# Patient Record
Sex: MALE
[De-identification: names, ages, dates, MRNs, and addresses within clinical notes are randomized per-mention and may not be internally consistent; named-entity substitution may affect disease eponyms.]

---

## 2018-04-27 ENCOUNTER — HOSPITAL ENCOUNTER (OUTPATIENT)
Dept: HOSPITAL 5 - CATHLABREC | Age: 23
Discharge: HOME | End: 2018-04-27
Attending: INTERNAL MEDICINE
Payer: COMMERCIAL

## 2018-04-27 VITALS — SYSTOLIC BLOOD PRESSURE: 102 MMHG | DIASTOLIC BLOOD PRESSURE: 47 MMHG

## 2018-04-27 DIAGNOSIS — R55: Primary | ICD-10-CM

## 2018-04-27 DIAGNOSIS — R94.39: ICD-10-CM

## 2018-04-27 DIAGNOSIS — Z88.1: ICD-10-CM

## 2018-04-27 DIAGNOSIS — F17.210: ICD-10-CM

## 2018-04-27 PROCEDURE — 93660 TILT TABLE EVALUATION: CPT

## 2018-04-27 NOTE — SHORT STAY SUMMARY
Short Stay Documentation


Date of service: 04/27/18





- History


H&P: obtained from office





- Allergies and Medications


Current Medications: 


 Allergies





cephalexin [From Keflex] Allergy (Verified 04/27/18 08:44)


 Rash





Active Medications





Sodium Chloride (Nacl 0.9% 500 Ml)  500 mls @ 50 mls/hr IV AS DIRECT KOBY


   Last Admin: 04/27/18 09:09 Dose:  50 mls/hr











- Physical exam


General appearance: no acute distress


Integumentary: no rash


HEENT: Atraumatic


Lungs: Clear to auscultation


Breasts: deferred


Heart: Regular rate


Gastrointestinal: normal


Male Genitourinary: deferred


Female Genitourinary: deferred


Rectal Exam: deferred


Extremities: no ischemia


Neurological: Normal gait





- Brief post op/procedure progress note


Date of procedure: 04/27/18


Pre-op diagnosis: Syncope


Post-op diagnosis: same


Procedure: 





TTT


Anesthesia: none


Findings: 





See report


Surgeon: CAITLIN WALLACE


Estimated blood loss: none


Pathology: none


Condition: stable





- Hospital course


Hospital course: 





Uneventful





- Disposition


Condition at discharge: Good


Disposition: DC-01 TO HOME OR SELFCARE





Short Stay Discharge Plan


Activity: other (Avoid rapid change in positions, Avoid prolong standing)


Diet: regular ([place] fluid and salt intake)


Follow up with: 


GEE SHORT MD [Primary Care Provider] - 7 Days

## 2018-04-28 NOTE — PROCEDURE NOTE
PROCEDURE:  Tilt table test.



ORDERING PHYSICIAN:  _____



INDICATION:  Syncope.



DESCRIPTION OF PROCEDURE:  After obtaining written consent, the patient was

placed on the tilt table test in the cath lab.  Baseline blood pressure was

110/60 with a heart rate of 72 beats per minute.  The patient was tilted to 85

degrees from horizontal.  The blood pressure immediately after tilting was

114/72 with a heart rate of 80 beats per minute.  8 minutes into the upright

tilting, the patient started complaining of sweatiness and dizziness.  His heart

rate started to decrease.  He developed initially sinus bradycardia and

ultimately went into prolonged asystole.  He was immediately turned back to

supine in Trendelenburg position and his heart rate immediately recovered to

sinus.  The lowest recorded blood pressure was 107/64.  Upon return to supine

position, blood pressure was 131/69 and the patient did regain consciousness

spontaneously.



IMPRESSION:  This is a positive tilt table test with a pronounced

cardioinhibitory response.  The patient developed asystole 8 minutes after

tilting without having to give nitroglycerin.



RECOMMENDATION:  The patient was recommended to follow up with referring

cardiologist.  The patient was advised regarding behavioral changes including

liberal salt and fluid intake, avoiding [place] changes in positions from sitting

or lying down to standing and avoid prolonged standing.  The patient will

discuss with his primary cardiologist future steps.





DD: 04/27/2018 12:58

DT: 04/28/2018 11:26

JOB# [id number]  [id number]

PAULETTE/MARCO